# Patient Record
Sex: FEMALE | Race: OTHER | HISPANIC OR LATINO | URBAN - METROPOLITAN AREA
[De-identification: names, ages, dates, MRNs, and addresses within clinical notes are randomized per-mention and may not be internally consistent; named-entity substitution may affect disease eponyms.]

---
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
## 2024-09-03 NOTE — BEGINNING OF VISIT
Pt called, her and her  tested + for covid yesterday.  She says its a mild case.  Chills on Sunday, joint pain on Monday.  What else should she do.     [Patient] : patient